# Patient Record
Sex: FEMALE | HISPANIC OR LATINO | Employment: FULL TIME | URBAN - METROPOLITAN AREA
[De-identification: names, ages, dates, MRNs, and addresses within clinical notes are randomized per-mention and may not be internally consistent; named-entity substitution may affect disease eponyms.]

---

## 2024-09-27 ENCOUNTER — OFFICE VISIT (OUTPATIENT)
Dept: INTERNAL MEDICINE | Facility: CLINIC | Age: 29
End: 2024-09-27
Payer: COMMERCIAL

## 2024-09-27 VITALS
HEIGHT: 65 IN | DIASTOLIC BLOOD PRESSURE: 70 MMHG | RESPIRATION RATE: 18 BRPM | OXYGEN SATURATION: 96 % | TEMPERATURE: 96.2 F | BODY MASS INDEX: 37.65 KG/M2 | SYSTOLIC BLOOD PRESSURE: 118 MMHG | WEIGHT: 226 LBS | HEART RATE: 89 BPM

## 2024-09-27 DIAGNOSIS — Z11.59 NEED FOR HEPATITIS C SCREENING TEST: ICD-10-CM

## 2024-09-27 DIAGNOSIS — N83.201 CYSTS OF BOTH OVARIES: ICD-10-CM

## 2024-09-27 DIAGNOSIS — Z00.00 ANNUAL PHYSICAL EXAM: ICD-10-CM

## 2024-09-27 DIAGNOSIS — R74.01 TRANSAMINITIS: ICD-10-CM

## 2024-09-27 DIAGNOSIS — N83.202 CYSTS OF BOTH OVARIES: ICD-10-CM

## 2024-09-27 DIAGNOSIS — Z00.00 ENCOUNTER FOR MEDICAL EXAMINATION TO ESTABLISH CARE: Primary | ICD-10-CM

## 2024-09-27 LAB
ALBUMIN SERPL-MCNC: 4.2 G/DL (ref 3.5–5.2)
ALBUMIN/GLOB SERPL: 1.3 G/DL
ALP SERPL-CCNC: 79 U/L (ref 39–117)
ALT SERPL W P-5'-P-CCNC: 101 U/L (ref 1–33)
ANION GAP SERPL CALCULATED.3IONS-SCNC: 9.7 MMOL/L (ref 5–15)
AST SERPL-CCNC: 46 U/L (ref 1–32)
BASOPHILS # BLD AUTO: 0.03 10*3/MM3 (ref 0–0.2)
BASOPHILS NFR BLD AUTO: 0.4 % (ref 0–1.5)
BILIRUB SERPL-MCNC: 0.6 MG/DL (ref 0–1.2)
BUN SERPL-MCNC: 13 MG/DL (ref 6–20)
BUN/CREAT SERPL: 17.6 (ref 7–25)
CALCIUM SPEC-SCNC: 9.4 MG/DL (ref 8.6–10.5)
CHLORIDE SERPL-SCNC: 105 MMOL/L (ref 98–107)
CHOLEST SERPL-MCNC: 167 MG/DL (ref 0–200)
CO2 SERPL-SCNC: 21.3 MMOL/L (ref 22–29)
CREAT SERPL-MCNC: 0.74 MG/DL (ref 0.57–1)
DEPRECATED RDW RBC AUTO: 41.4 FL (ref 37–54)
EGFRCR SERPLBLD CKD-EPI 2021: 113.2 ML/MIN/1.73
EOSINOPHIL # BLD AUTO: 0.19 10*3/MM3 (ref 0–0.4)
EOSINOPHIL NFR BLD AUTO: 2.4 % (ref 0.3–6.2)
ERYTHROCYTE [DISTWIDTH] IN BLOOD BY AUTOMATED COUNT: 12.4 % (ref 12.3–15.4)
GLOBULIN UR ELPH-MCNC: 3.3 GM/DL
GLUCOSE SERPL-MCNC: 95 MG/DL (ref 65–99)
HCT VFR BLD AUTO: 43.8 % (ref 34–46.6)
HCV AB SER QL: NORMAL
HDLC SERPL-MCNC: 40 MG/DL (ref 40–60)
HGB BLD-MCNC: 14.5 G/DL (ref 12–15.9)
IMM GRANULOCYTES # BLD AUTO: 0.04 10*3/MM3 (ref 0–0.05)
IMM GRANULOCYTES NFR BLD AUTO: 0.5 % (ref 0–0.5)
LDLC SERPL CALC-MCNC: 114 MG/DL (ref 0–100)
LDLC/HDLC SERPL: 2.84 {RATIO}
LYMPHOCYTES # BLD AUTO: 2.25 10*3/MM3 (ref 0.7–3.1)
LYMPHOCYTES NFR BLD AUTO: 29 % (ref 19.6–45.3)
MCH RBC QN AUTO: 29.9 PG (ref 26.6–33)
MCHC RBC AUTO-ENTMCNC: 33.1 G/DL (ref 31.5–35.7)
MCV RBC AUTO: 90.3 FL (ref 79–97)
MONOCYTES # BLD AUTO: 0.65 10*3/MM3 (ref 0.1–0.9)
MONOCYTES NFR BLD AUTO: 8.4 % (ref 5–12)
NEUTROPHILS NFR BLD AUTO: 4.61 10*3/MM3 (ref 1.7–7)
NEUTROPHILS NFR BLD AUTO: 59.3 % (ref 42.7–76)
NRBC BLD AUTO-RTO: 0 /100 WBC (ref 0–0.2)
PLATELET # BLD AUTO: 331 10*3/MM3 (ref 140–450)
PMV BLD AUTO: 10.4 FL (ref 6–12)
POTASSIUM SERPL-SCNC: 3.7 MMOL/L (ref 3.5–5.2)
PROT SERPL-MCNC: 7.5 G/DL (ref 6–8.5)
RBC # BLD AUTO: 4.85 10*6/MM3 (ref 3.77–5.28)
SODIUM SERPL-SCNC: 136 MMOL/L (ref 136–145)
TRIGL SERPL-MCNC: 68 MG/DL (ref 0–150)
TSH SERPL DL<=0.05 MIU/L-ACNC: 2.39 UIU/ML (ref 0.27–4.2)
VLDLC SERPL-MCNC: 13 MG/DL (ref 5–40)
WBC NRBC COR # BLD AUTO: 7.77 10*3/MM3 (ref 3.4–10.8)

## 2024-09-27 PROCEDURE — 80053 COMPREHEN METABOLIC PANEL: CPT | Performed by: NURSE PRACTITIONER

## 2024-09-27 PROCEDURE — 90471 IMMUNIZATION ADMIN: CPT | Performed by: NURSE PRACTITIONER

## 2024-09-27 PROCEDURE — 80061 LIPID PANEL: CPT | Performed by: NURSE PRACTITIONER

## 2024-09-27 PROCEDURE — 36415 COLL VENOUS BLD VENIPUNCTURE: CPT | Performed by: NURSE PRACTITIONER

## 2024-09-27 PROCEDURE — 85025 COMPLETE CBC W/AUTO DIFF WBC: CPT | Performed by: NURSE PRACTITIONER

## 2024-09-27 PROCEDURE — 90656 IIV3 VACC NO PRSV 0.5 ML IM: CPT | Performed by: NURSE PRACTITIONER

## 2024-09-27 PROCEDURE — 84443 ASSAY THYROID STIM HORMONE: CPT | Performed by: NURSE PRACTITIONER

## 2024-09-27 PROCEDURE — 86803 HEPATITIS C AB TEST: CPT | Performed by: NURSE PRACTITIONER

## 2024-10-18 ENCOUNTER — TELEPHONE (OUTPATIENT)
Dept: INTERNAL MEDICINE | Facility: CLINIC | Age: 29
End: 2024-10-18
Payer: COMMERCIAL

## 2024-10-18 ENCOUNTER — HOSPITAL ENCOUNTER (OUTPATIENT)
Dept: ULTRASOUND IMAGING | Facility: HOSPITAL | Age: 29
Discharge: HOME | End: 2024-10-18
Admitting: NURSE PRACTITIONER
Payer: COMMERCIAL

## 2024-10-18 DIAGNOSIS — N83.202 CYSTS OF BOTH OVARIES: ICD-10-CM

## 2024-10-18 DIAGNOSIS — N83.201 CYSTS OF BOTH OVARIES: ICD-10-CM

## 2024-10-18 PROCEDURE — 76830 TRANSVAGINAL US NON-OB: CPT

## 2024-10-18 NOTE — TELEPHONE ENCOUNTER
Caller: Daniel Bradley    Relationship: Self    Best call back number: [phone]     What form or medical record are you requesting:PHYSICAL PAPERWORK     Who is requesting this form or medical record from you: PATIENT     How would you like to receive the form or medical records (pick-up, mail, fax):        Timeframe paperwork needed: AS SOON AS POSSIBLE     Additional notes: PLEASE CALL AND ADVISE

## 2024-10-21 NOTE — TELEPHONE ENCOUNTER
Spoke with patient in regards paperwork patient stated insurance requested a discharge summary from last visit. Printed out papers and left them at  for patient to .

## 2024-10-28 ENCOUNTER — TELEPHONE (OUTPATIENT)
Dept: OBSTETRICS AND GYNECOLOGY | Facility: CLINIC | Age: 29
End: 2024-10-28

## 2024-10-28 NOTE — TELEPHONE ENCOUNTER
This patient requires an  for their appointment. Please see the  information below.     Caller: ZAHRA DOMÍNGUEZ  Relationship to patient: SELF  Best call back number: [phone] (home)    Service:IPAD  Is  needs updated in registration: yes  Date of Appointment:01/30/2025  Time of Appointment:215 PM  Additional notes: NA

## 2025-02-03 NOTE — PROGRESS NOTES
"GYN Problem/Follow Up Visit    Chief Complaint   Patient presents with    Ovarian Cyst     US done 10/18/24       Use of language line solutions audio      HPI  Daniel Bradley is a 29 y.o. female, , who presents for follow up pelvic ultrasound, ordered by PCP 10- for infrequent and painful menstrual periods, menses infrequent, occur on average 1 a year when off birth control pills. Has been off OCP over 9 months    Hx of PCOS, hx of cystectomy 2019    US Non-ob Transvaginal (10/18/2024 13:07): hyperechoic structure in the right ovary up to 0.6 cm    Last week experienced experienced pins and needles sensation of lower pelvis, last menstrual period 8 months ago    Additional OB/GYN History   No LMP recorded (lmp unknown).  Current contraception: contraceptive methods: natural family planning    Past Medical History:   Diagnosis Date    Abnormal Pap smear of cervix     Ovarian cyst     Polycystic ovary syndrome       Past Surgical History:   Procedure Laterality Date    APPENDECTOMY      OVARIAN CYST REMOVAL  2019      Family History   Problem Relation Age of Onset    Ovarian cancer Neg Hx     Uterine cancer Neg Hx     Breast cancer Neg Hx     Colon cancer Neg Hx     Deep vein thrombosis Neg Hx     Pulmonary embolism Neg Hx      Allergies as of 2025    (No Known Allergies)      The additional following portions of the patient's history were reviewed and updated as appropriate: allergies, current medications, past family history, past medical history, past social history, past surgical history, and problem list.    Review of Systems    See HPI for pertinent ROS    Objective   /87   Pulse 104   Ht 165 cm (64.96\")   Wt 103 kg (226 lb)   LMP  (LMP Unknown) Comment: Several months ago, clots  Breastfeeding No   BMI 37.65 kg/m²     Physical Exam  Vitals and nursing note reviewed. Exam conducted with a chaperone present.   Constitutional:       Appearance: Normal appearance. "   Cardiovascular:      Rate and Rhythm: Normal rate.   Pulmonary:      Effort: Pulmonary effort is normal.   Genitourinary:     General: Normal vulva.      Vagina: Normal.      Cervix: Normal.      Uterus: Normal. Tender (mildly).       Adnexa: Right adnexa normal and left adnexa normal.   Lymphadenopathy:      Lower Body: No right inguinal adenopathy. No left inguinal adenopathy.   Skin:     General: Skin is warm and dry.   Neurological:      Mental Status: She is alert and oriented to person, place, and time.          Assessment and Plan    Diagnoses and all orders for this visit:    1. Right ovarian cyst (Primary)  -     US Non-ob Transvaginal; Future    2. Amenorrhea  -     medroxyPROGESTERone (Provera) 10 MG tablet; If no menses for 90 days will start provera tablet, take 1 tab daily for 10 days if no menses  Dispense: 10 tablet; Refill: 3  -     POC Pregnancy, Urine    3. Screen for STD (sexually transmitted disease)  -     Chlamydia trachomatis, Neisseria gonorrhoeae, PCR - Swab, Cervix      HCG, Urine, QL   Date Value Ref Range Status   02/04/2025 Negative Negative Final       Counseling:  TRACK MENSES, RTO if <q21d, >7d long, heavy or painful.    PCOS- Dx, Tx, weight, pregnancy, periods/anovulation, cholesterol, insulin, and uterine cancer risk discussed w pt.  Provera every 90 days for endometrial protection if no menstruation in 90 days, discontinue provera if pregnancy occurs , PNV daily and healthy lifestyle if desires pregnancy  Pelvic ultrasound scheduled in surveillance of right ovarian cyst     She understands the importance of having any ordered tests to be performed in a timely fashion.  The risks of not performing them include, but are not limited to, advanced cancer stages, bone loss from osteoporosis and/or subsequent increase in morbidity and/or mortality.  She is encouraged to review her results online and/or contact or office if she has questions.     Follow Up:  Return if symptoms worsen or  fail to improve, for will call with results of ultrasound and plan of care recommendations.        Radha Allred, APRN  02/04/2025

## 2025-02-04 ENCOUNTER — OFFICE VISIT (OUTPATIENT)
Dept: OBSTETRICS AND GYNECOLOGY | Facility: CLINIC | Age: 30
End: 2025-02-04
Payer: COMMERCIAL

## 2025-02-04 VITALS
DIASTOLIC BLOOD PRESSURE: 87 MMHG | BODY MASS INDEX: 37.65 KG/M2 | HEART RATE: 104 BPM | WEIGHT: 226 LBS | SYSTOLIC BLOOD PRESSURE: 123 MMHG | HEIGHT: 65 IN

## 2025-02-04 DIAGNOSIS — N91.2 AMENORRHEA: ICD-10-CM

## 2025-02-04 DIAGNOSIS — Z11.3 SCREEN FOR STD (SEXUALLY TRANSMITTED DISEASE): ICD-10-CM

## 2025-02-04 DIAGNOSIS — N83.201 RIGHT OVARIAN CYST: Primary | ICD-10-CM

## 2025-02-04 LAB
B-HCG UR QL: NEGATIVE
C TRACH RRNA CVX QL NAA+PROBE: NOT DETECTED
EXPIRATION DATE: NORMAL
INTERNAL NEGATIVE CONTROL: NORMAL
INTERNAL POSITIVE CONTROL: NORMAL
Lab: NORMAL
N GONORRHOEA RRNA SPEC QL NAA+PROBE: NOT DETECTED

## 2025-02-04 PROCEDURE — 87491 CHLMYD TRACH DNA AMP PROBE: CPT | Performed by: NURSE PRACTITIONER

## 2025-02-04 PROCEDURE — 87591 N.GONORRHOEAE DNA AMP PROB: CPT | Performed by: NURSE PRACTITIONER

## 2025-02-04 RX ORDER — MEDROXYPROGESTERONE ACETATE 10 MG
TABLET ORAL
Qty: 10 TABLET | Refills: 3 | Status: SHIPPED | OUTPATIENT
Start: 2025-02-04

## 2025-02-05 ENCOUNTER — TELEPHONE (OUTPATIENT)
Dept: OBSTETRICS AND GYNECOLOGY | Facility: CLINIC | Age: 30
End: 2025-02-05
Payer: COMMERCIAL

## 2025-02-05 NOTE — TELEPHONE ENCOUNTER
Left voicemail for patient with the US d/t/loc and arr 15 min early, no prep. Also, left on voicemail the scheduling number if needed to r/s  opt 3.

## 2025-02-28 ENCOUNTER — HOSPITAL ENCOUNTER (OUTPATIENT)
Dept: ULTRASOUND IMAGING | Facility: HOSPITAL | Age: 30
Discharge: HOME | End: 2025-02-28
Admitting: NURSE PRACTITIONER
Payer: COMMERCIAL

## 2025-02-28 DIAGNOSIS — N83.201 RIGHT OVARIAN CYST: ICD-10-CM

## 2025-02-28 PROCEDURE — 76830 TRANSVAGINAL US NON-OB: CPT

## 2025-03-06 ENCOUNTER — TELEPHONE (OUTPATIENT)
Dept: OBSTETRICS AND GYNECOLOGY | Facility: CLINIC | Age: 30
End: 2025-03-06
Payer: COMMERCIAL

## 2025-03-06 NOTE — TELEPHONE ENCOUNTER
Left a message for the patient trying to discuss her ultrasound results.  services used to help with call.

## 2025-03-06 NOTE — TELEPHONE ENCOUNTER
Patient called back and got call connected with  services. Patient informed of her results and recommendations. Appointment offered but unable to come before will be out of town. Appointment scheduled for after she returns. She requested to be added to the waitlist to try to get in before she leaves. Patient also advised she may call to check in cancellations.

## 2025-03-06 NOTE — TELEPHONE ENCOUNTER
----- Message from Radha Chalino sent at 3/6/2025  6:21 AM EST -----  Pelvic ultrasound demonstrates slight interval growth of the complex right ovarian lesion (dermoid cyst (typically benign and may contain solid tissue) verses endometrioma(tissue from the lining of the uterus grows in the ovaries), Hx cystectomy 2019. Recommend follow up with MD here to discuss treatment, possible surgical management.

## 2025-03-28 ENCOUNTER — HOSPITAL ENCOUNTER (OUTPATIENT)
Dept: CARDIOLOGY | Facility: HOSPITAL | Age: 30
Discharge: HOME | End: 2025-03-28
Payer: COMMERCIAL

## 2025-03-28 ENCOUNTER — TRANSCRIBE ORDERS (OUTPATIENT)
Dept: ADMINISTRATIVE | Facility: HOSPITAL | Age: 30
End: 2025-03-28
Payer: COMMERCIAL

## 2025-03-28 ENCOUNTER — LAB (OUTPATIENT)
Dept: LAB | Facility: HOSPITAL | Age: 30
End: 2025-03-28
Payer: COMMERCIAL

## 2025-03-28 DIAGNOSIS — Z01.818 PRE-OP TESTING: ICD-10-CM

## 2025-03-28 DIAGNOSIS — Z01.818 PRE-OP TESTING: Primary | ICD-10-CM

## 2025-03-28 LAB
BACTERIA UR QL AUTO: ABNORMAL /HPF
BILIRUB UR QL STRIP: NEGATIVE
CLARITY UR: CLEAR
COLOR UR: YELLOW
GLUCOSE UR STRIP-MCNC: NEGATIVE MG/DL
HGB UR QL STRIP.AUTO: NEGATIVE
HYALINE CASTS UR QL AUTO: ABNORMAL /LPF
KETONES UR QL STRIP: NEGATIVE
LEUKOCYTE ESTERASE UR QL STRIP.AUTO: ABNORMAL
NITRITE UR QL STRIP: NEGATIVE
PH UR STRIP.AUTO: 5.5 [PH] (ref 5–8)
PROT UR QL STRIP: NEGATIVE
QT INTERVAL: 341 MS
QTC INTERVAL: 411 MS
RBC # UR STRIP: ABNORMAL /HPF
REF LAB TEST METHOD: ABNORMAL
SP GR UR STRIP: 1.02 (ref 1–1.03)
SQUAMOUS #/AREA URNS HPF: ABNORMAL /HPF
UROBILINOGEN UR QL STRIP: ABNORMAL
WBC # UR STRIP: ABNORMAL /HPF

## 2025-03-28 PROCEDURE — 81001 URINALYSIS AUTO W/SCOPE: CPT

## 2025-03-28 PROCEDURE — 93005 ELECTROCARDIOGRAM TRACING: CPT | Performed by: PLASTIC SURGERY

## 2025-04-02 LAB
QT INTERVAL: 341 MS
QTC INTERVAL: 411 MS

## 2025-04-07 ENCOUNTER — OFFICE VISIT (OUTPATIENT)
Dept: INTERNAL MEDICINE | Facility: CLINIC | Age: 30
End: 2025-04-07
Payer: COMMERCIAL

## 2025-04-07 VITALS
BODY MASS INDEX: 38.15 KG/M2 | DIASTOLIC BLOOD PRESSURE: 70 MMHG | SYSTOLIC BLOOD PRESSURE: 118 MMHG | HEART RATE: 92 BPM | WEIGHT: 229 LBS | TEMPERATURE: 97.8 F | OXYGEN SATURATION: 98 % | HEIGHT: 65 IN | RESPIRATION RATE: 18 BRPM

## 2025-04-07 DIAGNOSIS — Z00.00 PHYSICAL EXAM: Primary | ICD-10-CM

## 2025-04-07 LAB
ALBUMIN SERPL-MCNC: 4.2 G/DL (ref 3.5–5.2)
ALBUMIN/GLOB SERPL: 1.2 G/DL
ALP SERPL-CCNC: 78 U/L (ref 39–117)
ALT SERPL W P-5'-P-CCNC: 80 U/L (ref 1–33)
ANION GAP SERPL CALCULATED.3IONS-SCNC: 10.4 MMOL/L (ref 5–15)
AST SERPL-CCNC: 43 U/L (ref 1–32)
BILIRUB SERPL-MCNC: 0.5 MG/DL (ref 0–1.2)
BUN SERPL-MCNC: 10 MG/DL (ref 6–20)
BUN/CREAT SERPL: 14.9 (ref 7–25)
CALCIUM SPEC-SCNC: 9.5 MG/DL (ref 8.6–10.5)
CHLORIDE SERPL-SCNC: 104 MMOL/L (ref 98–107)
CO2 SERPL-SCNC: 24.6 MMOL/L (ref 22–29)
CREAT SERPL-MCNC: 0.67 MG/DL (ref 0.57–1)
EGFRCR SERPLBLD CKD-EPI 2021: 121.5 ML/MIN/1.73
GLOBULIN UR ELPH-MCNC: 3.5 GM/DL
GLUCOSE SERPL-MCNC: 79 MG/DL (ref 65–99)
POTASSIUM SERPL-SCNC: 4.2 MMOL/L (ref 3.5–5.2)
PROT SERPL-MCNC: 7.7 G/DL (ref 6–8.5)
SODIUM SERPL-SCNC: 139 MMOL/L (ref 136–145)

## 2025-04-07 NOTE — PROGRESS NOTES
"Chief Complaint  Surgical Clearance (Liposuctions scheduled for 4/5/15/25)      Subjective      History of Present Illness  The patient is a 29-year-old female presenting for preoperative clearance for liposuction scheduled on 04/15/2025. Preoperative laboratory and imaging studies are within normal limits, with the exception of findings on physical examination. The patient denies experiencing chest pain, dyspnea, dizziness, or cephalalgia. Her surgical history includes an appendectomy performed in 2008 and a procedure for polycystic ovary syndrome conducted approximately five years ago in her home country. No complications related to anesthesia have been reported.         Objective   Vital Signs:   Vitals:    04/07/25 0730   BP: 118/70   BP Location: Left arm   Patient Position: Sitting   Cuff Size: Adult   Pulse: 92   Resp: 18   Temp: 97.8 °F (36.6 °C)   TempSrc: Temporal   SpO2: 98%   Weight: 104 kg (229 lb)   Height: 165 cm (64.96\")     Body mass index is 38.15 kg/m².    Wt Readings from Last 3 Encounters:   04/07/25 104 kg (229 lb)   02/04/25 103 kg (226 lb)   09/27/24 103 kg (226 lb)     BP Readings from Last 3 Encounters:   04/07/25 118/70   02/04/25 123/87   09/27/24 118/70       Health Maintenance   Topic Date Due    Pneumococcal Vaccine 0-49 (1 of 2 - PCV) Never done    TDAP/TD VACCINES (1 - Tdap) Never done    PAP SMEAR  Never done    COVID-19 Vaccine (1 - 2024-25 season) Never done    INFLUENZA VACCINE  07/01/2025    ANNUAL PHYSICAL  09/27/2025    HEPATITIS C SCREENING  Completed       Physical Exam  Vitals and nursing note reviewed.   Constitutional:       General: She is not in acute distress.     Appearance: Normal appearance.   HENT:      Head: Normocephalic and atraumatic.      Right Ear: External ear normal.      Left Ear: External ear normal.      Nose: Nose normal.      Mouth/Throat:      Mouth: Mucous membranes are moist.   Eyes:      Conjunctiva/sclera: Conjunctivae normal.   Cardiovascular:    "   Rate and Rhythm: Normal rate and regular rhythm.      Pulses: Normal pulses.      Heart sounds: Normal heart sounds. No murmur heard.     No friction rub. No gallop.   Pulmonary:      Effort: Pulmonary effort is normal. No respiratory distress.      Breath sounds: No wheezing, rhonchi or rales.   Musculoskeletal:      Cervical back: Neck supple.      Right lower leg: No edema.      Left lower leg: No edema.   Skin:     General: Skin is warm and dry.   Neurological:      General: No focal deficit present.      Mental Status: She is alert and oriented to person, place, and time.   Psychiatric:         Mood and Affect: Mood normal.         Behavior: Behavior normal.        Physical Exam        Result Review :  The following data was reviewed by: DOLORES Lopez on 04/07/2025:         Results  Laboratory Studies  Elevated liver enzymes in December.            Procedures            Assessment & Plan  Physical exam              Assessment & Plan  1. Preoperative clearance for liposuction  - BP normal today  - Elevated liver enzymes in December 2024  - Informed of anesthesia risks and infection  - Repeat liver function test today    PROCEDURE  Appendectomy (2008) and polycystic ovary syndrome procedure (5 years ago).    Patient or patient representative verbalized consent for the use of Ambient Listening during the visit with  DOLORES Lopez for chart documentation. 4/7/2025  08:38 EDT      FOLLOW UP  No follow-ups on file.  Patient was given instructions and counseling regarding her condition or for health maintenance advice. Please see specific information pulled into the AVS if appropriate.     DOLORES Lopez  04/07/25  08:40 EDT    CURRENT & DISCONTINUED MEDICATIONS  Current Outpatient Medications   Medication Instructions    medroxyPROGESTERone (Provera) 10 MG tablet If no menses for 90 days will start provera tablet, take 1 tab daily for 10 days if no menses       There are no discontinued  medications.

## 2025-05-14 NOTE — PROGRESS NOTES
"GYN Visit    Chief Complaint   Patient presents with    Follow-up       HPI:   29 y.o. LMP: Patient's last menstrual period was 2025.     Social History     Substance and Sexual Activity   Sexual Activity Yes    Partners: Male    Birth control/protection: None       History of Present Illness  The patient is a 29-year-old female who presents today for a GYN visit.    She reports no current pain but notes an absence of menstruation. Her last menstrual cycle was from 2025 to 2025, with a single day of bleeding on 2025, after which she has not experienced any further bleeding. She expresses a desire to conceive. She has been prescribed Provera, which she took daily for 10 days, resulting in a menstrual period. She discontinued the medication as per instructions, which advised its use only if a period did not occur within 3 months.    She has been informed of the presence of a small cyst on her ovary, measuring less than 1 cm, which is suspected to be a dermoid cyst. She has a history of ovarian cysts, having undergone surgical removal of a cyst from her right ovary approximately 5 years ago in her native country.  The pathology that she was able to acquire from records today revealing a mature cystic teratoma and a serous cystadenoma removed from the left ovary.     GYNECOLOGICAL HISTORY:  Last menstrual period: 2025    PAST SURGICAL HISTORY:  Surgical removal of a cysts from bilateral ovaries approximately 5 years ago.        History: PMHx, Meds, Allergies, PSHx, Social Hx, and POBHx all reviewed and updated.  Last Completed Pap Smear    This patient has no relevant Health Maintenance data.         PHYSICAL EXAM:  /81   Pulse 92   Ht 165 cm (64.96\")   Wt 102 kg (224 lb)   LMP 2025   BMI 37.32 kg/m²   General- NAD, alert and oriented, appropriate  Psych- Normal mood  Respiratory- No labored breathing  Abdomen- Soft, non distended, non tender  Extremities- No " edema  Skin- No lesions, no rashes        Results  Imaging   - Ultrasound 8 mm hyperechoic nonvascular right intraovarian lesion, left ovary with multiple peripheral follicles      ASSESSMENT AND PLAN:  Diagnoses and all orders for this visit:    1. Cyst of ovary, unspecified laterality (Primary)    2. Trying to get pregnant  -     letrozole (FEMARA) 2.5 MG tablet; Take 1 tablet by mouth Daily. Take as directed on days 3-7 of menstrual cycle.  Dispense: 5 tablet; Refill: 2    3. Amenorrhea                Assessment & Plan  1. Ovarian cyst  - Presence of a small cyst on the right ovary, <1 cm, likely residual scar tissue from previous ovarian surgery that was determined to be a dermoid  - Surgical removal not recommended at this time  - Monitor for any changes or symptoms    2. Amenorrhea  - Reports lack of menstruation since 03/26/2025, possibly due to previous ovarian surgery  - Take Provera once a day for 10 days to induce menstruation  - Upon menstruation, take the ovulation pill from cycle day 3 through 7  - Have intercourse every other day around the expected ovulation time (day 14) to increase chances of conception  - Discussed potential side effects of medication, including upset stomach, headaches, and bloating  - If pregnancy does not occur within 3 months, consider referral to an infertility specialist    Follow-up  - Follow up in 3 months      Follow Up:  Return in about 3 months (around 8/15/2025) for Recheck.    Patient or patient representative verbalized consent for the use of Ambient Listening during the visit with  Heide Guaman DO for chart documentation. 5/15/2025  18:09 EDT        Heide Guaman DO  05/15/2025    [facility] [address] [facility]  [address] [place] [address] [address] [place] [address]  Dept: [phone]  Dept Fax: [phone]  Loc: [phone]

## 2025-05-15 ENCOUNTER — OFFICE VISIT (OUTPATIENT)
Dept: OBSTETRICS AND GYNECOLOGY | Age: 30
End: 2025-05-15
Payer: COMMERCIAL

## 2025-05-15 VITALS
HEART RATE: 92 BPM | BODY MASS INDEX: 37.32 KG/M2 | HEIGHT: 65 IN | DIASTOLIC BLOOD PRESSURE: 81 MMHG | SYSTOLIC BLOOD PRESSURE: 120 MMHG | WEIGHT: 224 LBS

## 2025-05-15 DIAGNOSIS — Z78.9 TRYING TO GET PREGNANT: ICD-10-CM

## 2025-05-15 DIAGNOSIS — N83.209 CYST OF OVARY, UNSPECIFIED LATERALITY: Primary | ICD-10-CM

## 2025-05-15 DIAGNOSIS — N91.2 AMENORRHEA: ICD-10-CM

## 2025-05-15 RX ORDER — LETROZOLE 2.5 MG/1
2.5 TABLET, FILM COATED ORAL DAILY
Qty: 5 TABLET | Refills: 2 | Status: SHIPPED | OUTPATIENT
Start: 2025-05-15